# Patient Record
Sex: MALE | Race: WHITE | Employment: FULL TIME | URBAN - METROPOLITAN AREA
[De-identification: names, ages, dates, MRNs, and addresses within clinical notes are randomized per-mention and may not be internally consistent; named-entity substitution may affect disease eponyms.]

---

## 2021-07-01 ENCOUNTER — HOSPITAL ENCOUNTER (EMERGENCY)
Age: 36
Discharge: HOME | End: 2021-07-01
Attending: FAMILY MEDICINE
Payer: COMMERCIAL

## 2021-07-01 VITALS
OXYGEN SATURATION: 97 % | TEMPERATURE: 97.3 F | WEIGHT: 185 LBS | HEART RATE: 69 BPM | BODY MASS INDEX: 25.9 KG/M2 | DIASTOLIC BLOOD PRESSURE: 97 MMHG | HEIGHT: 71 IN | SYSTOLIC BLOOD PRESSURE: 130 MMHG | RESPIRATION RATE: 18 BRPM

## 2021-07-01 DIAGNOSIS — H10.212 CHEMICAL CONJUNCTIVITIS OF LEFT EYE: Primary | ICD-10-CM

## 2021-07-01 PROCEDURE — 2580000003 HC RX 258: Performed by: FAMILY MEDICINE

## 2021-07-01 PROCEDURE — 99283 EMERGENCY DEPT VISIT LOW MDM: CPT

## 2021-07-01 RX ORDER — MAGNESIUM HYDROXIDE 1200 MG/15ML
1000 LIQUID ORAL CONTINUOUS
Status: DISCONTINUED | OUTPATIENT
Start: 2021-07-01 | End: 2021-07-01 | Stop reason: HOSPADM

## 2021-07-01 RX ORDER — TOBRAMYCIN AND DEXAMETHASONE 3; 1 MG/ML; MG/ML
1 SUSPENSION/ DROPS OPHTHALMIC
Qty: 2.5 ML | Refills: 0 | Status: SHIPPED | OUTPATIENT
Start: 2021-07-01 | End: 2021-07-08

## 2021-07-01 RX ADMIN — SODIUM CHLORIDE 1000 ML: 900 IRRIGANT IRRIGATION at 13:30

## 2021-07-01 ASSESSMENT — VISUAL ACUITY
OS: 20/40
OU: 20/25
OD: 20/40

## 2021-07-01 NOTE — ED PROVIDER NOTES
[address] [facility]  Department of Emergency Medicine   ED  Encounter Note  Admit Date/RoomTime: 2021  1:06 PM  ED Room:     NAME: Brandon Ontiveros  : 1985  MRN: [id number]     Chief Complaint:  Eye Injury (Works at [employer] While giving Cerenia to a dog the needle came off spraying med into PT left eye. C/O burning in corner of eye, flushed for 15 minutes at work )    History of Present Illness        Brandon Ontiveros is a 30 y.o. old male presenting to the emergency department by private vehicle, for traumatic spray of medication accidentally blurred vision, erythema and pain to left eye, which began 1 hour(s) prior to arrival.  Patient had eye flushed for 15 minutes at work since onset his symptoms have been persistent and moderate in severity. Associated signs & symptoms of: nothing additional. The patients tetanus status is more than 10 years ago. Circumstances:    []  Contact Lens Use     []  Recent URI Sx's     []  Spontaneous Onset     []  Close Contact w/similar Sx's     [x]  Work Related     History of:     []   Glaucoma     []   Recent Eye Surgery     ROS   Pertinent positives and negatives are stated within HPI, all other systems reviewed and are negative. Past Medical History:  has no past medical history on file. Surgical History:  has no past surgical history on file. Social History:  reports that he has never smoked. He has never used smokeless tobacco. He reports that he does not drink alcohol and does not use drugs. Family History: family history is not on file. Allergies: Patient has no known allergies.     Physical Exam   Oxygen Saturation Interpretation: Normal.        ED Triage Vitals   BP Temp Temp Source Pulse Resp SpO2 Height Weight   21 1300 21 1300 21 1300 21 1300 21 1300 21 1300 21 1311 21 1311   (!) 130/97 97.3 °F (36.3 °C) Infrared 69 18 97 % 5' 11\" (1.803 m) 185 lb (83.9 kg) Constitutional:  Alert, development consistent with age. HENT:  NC/NT. Airway patent. Neck:  Normal ROM. Supple. Eyes:         Pupils: equal, round, reactive to light and accommodation. Eyelids: Bilateral upper and lower Swelling/redness:  no swelling or erythema. Conjunctiva: Left injected(red). Sclera: Left injected(red). Cornea: Bilateral no abnormalities were observed. EOM:  Intact Bilaterally. Fundoscopic:  grossly normal- discs sharp. Visual Acuity:  Within Normal Limit. Integument:  No rashes, erythema present, unless noted elsewhere. Lymphatics: No lymphangitis or adenopathy noted. Neurological:  Oriented. Motor functions intact. Lab / Imaging Results   (All laboratory and radiology results have been personally reviewed by myself)  Labs:  No results found for this visit on 07/01/21. Imaging: All Radiology results interpreted by Radiologist unless otherwise noted. No orders to display       ED Course / Medical Decision Making     Medications   sodium chloride 0.9 % irrigation 1,000 mL (0 mLs Irrigation Stopped 7/1/21 1402)   gentamicin-prednisoLONE 0.3-1 % ophthalmic drops 1 drop (has no administration in time range)        Re-examination:  7/1/21       Time: 2:16  Patients condition is improving after treatment. Consult(s):   None    Procedure(s):  no procedures performed    MDM:   Patient had William lens irrigation after irrigation pH was 7 this is down from 8 patient is able to open his eyes without difficulty he still has some blurred vision however visual acuities 20/40 OS and OD he should wear glasses. Advised follow-up with ophthalmology use gentamicin as directed. Plan of Care/Counseling:  Steffany Anthony MD reviewed today's visit with the patient in addition to providing specific details for the plan of care and counseling regarding the diagnosis and prognosis.   Questions are answered at this time and are agreeable with the plan.    Assessment      1. Chemical conjunctivitis of left eye      Plan   Discharged home. Patient condition is good    New Medications     New Prescriptions    No medications on file     Electronically signed by Darrius Saucedo MD   DD: 7/1/21  **This report was transcribed using voice recognition software. Every effort was made to ensure accuracy; however, inadvertent computerized transcription errors may be present.   END OF ED PROVIDER NOTE        Darrius Saucedo MD  07/01/21 [id number]